# Patient Record
Sex: FEMALE | Race: AMERICAN INDIAN OR ALASKA NATIVE | URBAN - METROPOLITAN AREA
[De-identification: names, ages, dates, MRNs, and addresses within clinical notes are randomized per-mention and may not be internally consistent; named-entity substitution may affect disease eponyms.]

---

## 2022-07-07 ENCOUNTER — OFFICE VISIT (OUTPATIENT)
Dept: URBAN - METROPOLITAN AREA CLINIC 98 | Facility: CLINIC | Age: 82
End: 2022-07-07
Payer: MEDICARE

## 2022-07-07 ENCOUNTER — WEB ENCOUNTER (OUTPATIENT)
Dept: URBAN - METROPOLITAN AREA CLINIC 98 | Facility: CLINIC | Age: 82
End: 2022-07-07

## 2022-07-07 VITALS
TEMPERATURE: 98 F | HEART RATE: 59 BPM | HEIGHT: 65 IN | WEIGHT: 119.6 LBS | SYSTOLIC BLOOD PRESSURE: 109 MMHG | BODY MASS INDEX: 19.93 KG/M2 | DIASTOLIC BLOOD PRESSURE: 66 MMHG

## 2022-07-07 DIAGNOSIS — R15.0 INCOMPLETE DEFECATION: ICD-10-CM

## 2022-07-07 DIAGNOSIS — R15.9 FULL INCONTINENCE OF FECES: ICD-10-CM

## 2022-07-07 DIAGNOSIS — K52.838 OTHER MICROSCOPIC COLITIS: ICD-10-CM

## 2022-07-07 PROBLEM — 235753003: Status: ACTIVE | Noted: 2022-07-07

## 2022-07-07 PROBLEM — 142231000119108: Status: ACTIVE | Noted: 2022-07-07

## 2022-07-07 PROCEDURE — 99202 OFFICE O/P NEW SF 15 MIN: CPT | Performed by: INTERNAL MEDICINE

## 2022-07-07 RX ORDER — ATORVASTATIN CALCIUM 10 MG/1
1 TABLET TABLET, FILM COATED ORAL ONCE A DAY
Status: ACTIVE | COMMUNITY

## 2022-08-16 NOTE — HPI-OTHER HISTORIES
Chief Complaint  Anemia    Jairo Kramer,*  Jairo Kramer MD      Subjective          Bing Cruz presents to [facility] for follow up for colon cancer surveillance. She follows with Dr. Lewis and last saw him back in August of 2021. History of stage I colon cancer diagnosed in 7/7/2020 and status post colon resection. We also see her for iron deficiency anemia.     Patient reports she was recently at her daughter's house for 6 weeks. She is now living locally with her other daughter. Reports since she has been back from 6 week visit with daughter and now has loose stools up to 2 days. Reports she was eating apple sauce and using fiber supplements and now she feels like her diet has changed being at home. I discussed with the patient it sounds like change in diet has caused colon distress and changes. She is eating well otherwise, weight is stable.     We discussed recent labs done today: WBC 7.83, hemoglobin 12.1., platelet count 216,000. CMP is normal acceptable for a trace increase in the total bili level. Patient had CT scans in May 2022 after going to the ER and was noted to not have any acute abdominal or pelvis  abnormalities noted. CT chest with stable small sub-centimeter PN nodules over the past year done on 5/5/2022.         History of Present Illness   Ms. Bing Cruz presents for 1 year f    Cancer Staging  No matching staging information was found for the patient.     Treatment intent: curative    Oncology/Hematology History Overview Note   6/23/20 Ascending colon pathology revealed invasive, moderated      differentiated adenocarcinoma.  3.5 cm.  T1, N0, M0 = stage I                 Surgeries      7/8/2020 Right hemicholectomy with negative margins and 14 lymph nodes negative performed by Dr. Back.         Malignant neoplasm of colon (HCC)   7/27/2021 Initial Diagnosis    Malignant neoplasm of colon (CMS/HCC)         Review of  Dr Valero diagnosed her with microscopic colitis about 5 years ago she says.  Her chart is not available to review.  She says she has watched her diet and has better control of her bowels.  She does sometimes feel urgency and also fecal incontinence kita when walking around, in the morning  no abdominal pain, no weight changes Systems   Constitutional: Positive for fatigue. Negative for appetite change, diaphoresis, fever, unexpected weight gain and unexpected weight loss.   HENT: Negative for hearing loss, sore throat and voice change.    Eyes: Negative for blurred vision, double vision, pain, redness and visual disturbance.   Respiratory: Negative for cough, shortness of breath and wheezing.    Cardiovascular: Negative for chest pain, palpitations and leg swelling.   Gastrointestinal: Positive for diarrhea.   Endocrine: Negative for cold intolerance, heat intolerance, polydipsia and polyuria.   Genitourinary: Negative for decreased urine volume, difficulty urinating, frequency and urinary incontinence.   Musculoskeletal: Negative for arthralgias, back pain, joint swelling and myalgias.   Skin: Negative for color change, rash, skin lesions and wound.   Neurological: Positive for weakness. Negative for dizziness, seizures, numbness and headache.   Hematological: Negative for adenopathy. Does not bruise/bleed easily.   Psychiatric/Behavioral: Negative for depressed mood. The patient is not nervous/anxious.    All other systems reviewed and are negative.      Current Outpatient Medications on File Prior to Visit   Medication Sig Dispense Refill   • acetaminophen (TYLENOL) 325 MG tablet Take 1 tablet by mouth Every 4 (Four) Hours As Needed for Fever (Temperature Greater Than 101F).     • aluminum-magnesium hydroxide-simethicone (MAALOX MAX) 400-400-40 MG/5ML suspension Take 15 mL by mouth Every 6 (Six) Hours As Needed for Heartburn.     • apixaban (ELIQUIS) 2.5 MG tablet tablet Take 1 tablet by mouth 2 (Two) Times a Day. 60 tablet    • ascorbic acid (VITAMIN C) 500 MG tablet Take 500 mg by mouth Daily.     • AZO-CRANBERRY PO Take  by mouth.     • colestipol (COLESTID) 1 g tablet TAKE 1 TABLET BY ORAL ROUTE 3 TIMES A DAY AS NEEDED FOR 30 DAYS DIARRHEA 270 tablet 1   • escitalopram (Lexapro) 5 MG tablet Take 1 tablet by mouth Daily. 30 tablet  3   • furosemide (Lasix) 20 MG tablet Take 1 tablet by mouth 2 (Two) Times a Day. 90 tablet 3   • Lactobacillus (Florajen Acidophilus) capsule Take 1 each by mouth Daily. 90 capsule 3   • latanoprost (XALATAN) 0.005 % ophthalmic solution Administer 1 drop to both eyes Daily.     • LORazepam (ATIVAN) 0.5 MG tablet Take 1 tablet by mouth At Night As Needed for Anxiety. 30 tablet 5   • magnesium oxide (MAG-OX) 400 tablet tablet Take 1 tablet by mouth 2 (Two) Times a Day.     • metoprolol succinate XL (TOPROL-XL) 100 MG 24 hr tablet TAKE ONE TABLET TWICE A  tablet 0   • potassium chloride 10 MEQ CR tablet Take 1 tablet by mouth 2 (Two) Times a Day. 180 tablet 3   • Probiotic Product (Florajen3) capsule TAKE 1 CAPSULE BY MOUTH EVERY DAY 90 capsule 1     No current facility-administered medications on file prior to visit.       Allergies   Allergen Reactions   • Citalopram Unknown - High Severity   • Clonazepam Unknown - High Severity   • Levofloxacin Nausea And Vomiting   • Lisinopril Unknown - High Severity   • Macrobid [Nitrofurantoin Macrocrystal] Rash   • Melatonin Unknown - High Severity     Past Medical History:   Diagnosis Date   • Abdominal pain, generalized    • Anemia    • Aortic regurgitation    • Aortic stenosis    • Cancer (HCC)     colon    • Cardiomegaly    • Chronic atrial fibrillation (HCC) 01/01/2019    Atrial fibrillation converted to sinus through cardioversion (March 2019   • Chronic fatigue    • Diarrhea    • Disease of tricuspid valve    • Diverticulosis of colon    • DVT (deep venous thrombosis) ([facility])     LEFT LEG GREATER THAN 5 YRS AGO   • Dysphagia    • Essential (primary) hypertension    • Hiatal hernia    • Insomnia, unspecified    • LVH (left ventricular hypertrophy)    • Major depressive disorder, single episode    • Mitral regurgitation    • Mitral valve insufficiency and aortic valve insufficiency    • Osteopenia    • Pain in joint, pelvic region and thigh    • TR (tricuspid  regurgitation)    • UTI (urinary tract infection)     antibx to be completed prior to procedure. ABIOLA Carlos RN ([facility]) notified.     Past Surgical History:   Procedure Laterality Date   • ABDOMINAL HYSTERECTOMY      WITH BSO    • ANKLE SURGERY      (L) ankle fracture   • BLADDER REPAIR     • BREAST BIOPSY      (L) breast biopsy   • CARDIOVERSION  2019   • CATARACT EXTRACTION      OU   • CHOLECYSTECTOMY      OPEN   • COLON SURGERY      STATES SHE HAD 12 INCHES OF COLON REMOVED IN JULY 2020 FOR COLON CANCER   • COLONOSCOPY  2020   • COLONOSCOPY N/A 10/29/2021    Procedure: COLONOSCOPY;  Surgeon: Edmar Back MD;  Location: [facility] ENDOSCOPY;  Service: General;  Laterality: N/A;  DIVERTICULOSIS/ANASTOMOSIS   • FEMUR OPEN REDUCTION INTERNAL FIXATION Left 07/21/2021    Procedure: FEMORAL NECK OPEN REDUCTION INTERNAL FIXATION;  Surgeon: Bam Warner MD;  Location: [facility] MAIN OR;  Service: Orthopedics;  Laterality: Left;   • HIP CLOSED REDUCTION Left 3/20/2022    Procedure: HIP CLOSED REDUCTION;  Surgeon: Harsha Gayle MD;  Location: [facility] MAIN OR;  Service: Orthopedics;  Laterality: Left;   • INGUINAL HERNIA REPAIR Left 09/29/2011   • TOTAL HIP ARTHROPLASTY Left 3/16/2022    Procedure: LEFT TOTAL HIP ARTHROPLASTY AND HARDWARE REMOVAL;  Surgeon: Bam Warner MD;  Location: [facility] MAIN OR;  Service: Orthopedics;  Laterality: Left;   • UPPER GASTROINTESTINAL ENDOSCOPY      WITH DILATATION     Social History     Socioeconomic History   • Marital status:    Tobacco Use   • Smoking status: Never Smoker   • Smokeless tobacco: Never Used   Vaping Use   • Vaping Use: Former   Substance and Sexual Activity   • Alcohol use: Not Currently   • Drug use: Never   • Sexual activity: Defer     Family History   Problem Relation Age of Onset   • Malig Hyperthermia Neg Hx      Immunization History   Administered Date(s) Administered   • COVID-19 (MODERNA) 1st, 2nd, 3rd Dose Only 03/10/2021, 04/07/2021   •  COVID-19 (MODERNA) BOOSTER 03/10/2021, 04/07/2021   • Fluzone High-Dose 65+yrs 11/12/2020, 11/08/2021   • Influenza, Unspecified 11/12/2020       Objective   Physical Exam  Vitals and nursing note reviewed.   Constitutional:       Appearance: Normal appearance. She is normal weight.   HENT:      Head: Normocephalic.      Mouth/Throat:      Mouth: Mucous membranes are moist.   Eyes:      Pupils: Pupils are equal, round, and reactive to light.   Cardiovascular:      Rate and Rhythm: Normal rate and regular rhythm.      Pulses: Normal pulses.      Heart sounds: Normal heart sounds. No murmur heard.  Pulmonary:      Effort: Pulmonary effort is normal. No respiratory distress.      Breath sounds: Normal breath sounds. No wheezing, rhonchi or rales.   Abdominal:      General: Bowel sounds are normal.      Palpations: Abdomen is soft.   Musculoskeletal:         General: Normal range of motion.      Cervical back: Normal range of motion and neck supple.   Skin:     General: Skin is warm and dry.      Capillary Refill: Capillary refill takes less than 2 seconds.   Neurological:      Mental Status: She is oriented to person, place, and time.   Psychiatric:         Mood and Affect: Mood normal.         Behavior: Behavior normal.         Thought Content: Thought content normal.         Judgment: Judgment normal.         Vitals:    08/16/22 0945   BP: 132/76   Pulse: 72   Resp: 18   Temp: 97.5 °F (36.4 °C)   SpO2: 96%   Weight: 64.2 kg (141 lb 8.6 oz)   PainSc: 0-No pain     ECOG score: 1         ECOG: (0) Fully Active - Able to Carry On All Pre-disease Performance Without Restriction  Fall Risk Assessment was completed, and patient is at low risk for falls.  PHQ-9 Total Score: 1       The patient is  experiencing fatigue. Fatigue score: 8    PT/OT Functional Screening: PT fx screen: No needs identified  Speech Functional Screening: Speech fx screen: No needs identified  Rehab to be ordered: Rehab to be ordered: No needs  identified        Result Review :   The following data was reviewed by: GABRIELLA Lance on 08/16/2022:  Lab Results   Component Value Date    HGB 12.1 08/16/2022    HCT 37.2 08/16/2022    MCV 93.2 08/16/2022     08/16/2022    WBC 7.83 08/16/2022    NEUTROABS 5.01 08/16/2022    LYMPHSABS 2.12 08/16/2022    MONOSABS 0.52 08/16/2022    EOSABS 0.13 08/16/2022    BASOSABS 0.04 08/16/2022     Lab Results   Component Value Date    GLUCOSE 100 (H) 08/16/2022    BUN 16 08/16/2022    CREATININE 0.78 08/16/2022     08/16/2022    K 3.7 08/16/2022     08/16/2022    CO2 21.9 (L) 08/16/2022    CALCIUM 9.2 08/16/2022    PROTEINTOT 7.3 08/16/2022    ALBUMIN 4.15 08/16/2022    BILITOT 1.3 (H) 08/16/2022    ALKPHOS 90 08/16/2022    AST 19 08/16/2022    ALT 6 08/16/2022          Assessment and Plan    Diagnoses and all orders for this visit:    1. Malignant neoplasm of ascending colon (HCC) (Primary)  -     Cancel: CBC & Differential; Future  -     Cancel: Comprehensive Metabolic Panel; Future  -     Cancel: CEA; Future  -     CBC & Differential; Future  -     CEA; Future  -     Comprehensive Metabolic Panel; Future    2. Iron deficiency anemia, unspecified iron deficiency anemia type  -     Cancel: CBC & Differential; Future  -     Cancel: Iron Profile; Future  -     Cancel: Ferritin; Future  -     CBC & Differential; Future  -     Ferritin; Future  -     Iron Profile; Future    Stage I colon cancer. Stable CBC and CMP. CEA is pending. Iron labs are pending. Recent diarhheal stools. We discussed to hold the fiber supplements for now and to hold the Colestipol and keep a journal of her food she is eating. Apparently, she just got back from seeing daughter in [place] for 6 weeks and her daughter had her eating very healthy and eating applesauce and now once she comes back to live with her [place] daughter, her colon is stressing and now with loose stools x  2 days. Discussed stopping all meds. If  still with loose stools, then she should follow up with her PCP for evaluation.         Patient Follow Up: follow up in 6 months with dr. Lewis.    Patient was given instructions and counseling regarding her condition or for health maintenance advice. Please see specific information pulled into the AVS if appropriate.     Rose Boykin, APRN    8/16/2022

## 2024-03-20 ENCOUNTER — OV EP (OUTPATIENT)
Dept: URBAN - METROPOLITAN AREA CLINIC 27 | Facility: CLINIC | Age: 84
End: 2024-03-20
Payer: MEDICARE

## 2024-03-20 VITALS
HEIGHT: 65 IN | HEART RATE: 60 BPM | WEIGHT: 111 LBS | SYSTOLIC BLOOD PRESSURE: 155 MMHG | DIASTOLIC BLOOD PRESSURE: 89 MMHG | BODY MASS INDEX: 18.49 KG/M2

## 2024-03-20 DIAGNOSIS — R63.4 ABNORMAL WEIGHT LOSS: ICD-10-CM

## 2024-03-20 DIAGNOSIS — R19.4 CHANGE IN BOWEL HABITS: ICD-10-CM

## 2024-03-20 DIAGNOSIS — Z79.82 LONG TERM (CURRENT) USE OF ASPIRIN: ICD-10-CM

## 2024-03-20 PROBLEM — 88111009: Status: ACTIVE | Noted: 2024-03-20

## 2024-03-20 PROBLEM — 267024001: Status: ACTIVE | Noted: 2024-03-20

## 2024-03-20 PROBLEM — 131531000119103: Status: ACTIVE | Noted: 2024-03-20

## 2024-03-20 PROCEDURE — 99204 OFFICE O/P NEW MOD 45 MIN: CPT | Performed by: PHYSICIAN ASSISTANT

## 2024-03-20 RX ORDER — ATORVASTATIN CALCIUM 10 MG/1
1 TABLET TABLET, FILM COATED ORAL ONCE A DAY
Status: ACTIVE | COMMUNITY

## 2024-03-20 NOTE — HPI-ZZZTODAY'S VISIT
Ms. Trujillo is an 83-year-old female new patient here for evaluation of fecal incontinence with Dr. Chaudhari. Her daughter is a patient of Dr. Chaudhari and is with her today. She has h/o intermittent diarrhea x at least 10 yrs, worsening over the last year. She has associated cramping, urgency and accidents. She has lost 7-8 lbs over the last 2 mos unintentionally. No rectal bleeding. She says she was diagnosed with microscopic colitis many years ago. Her last colonoscopy was >5 yrs ago and apparently normal. 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 PMH is significant for HTN; she denies cardiopulmonary dz. She takes ASA 81mg QD.

## 2024-03-29 ENCOUNTER — COLON (OUTPATIENT)
Dept: URBAN - METROPOLITAN AREA SURGERY CENTER 7 | Facility: SURGERY CENTER | Age: 84
End: 2024-03-29

## 2024-03-29 RX ORDER — ATORVASTATIN CALCIUM 10 MG/1
1 TABLET TABLET, FILM COATED ORAL ONCE A DAY
Status: ACTIVE | COMMUNITY

## 2024-04-26 ENCOUNTER — OV EP (OUTPATIENT)
Dept: URBAN - METROPOLITAN AREA CLINIC 27 | Facility: CLINIC | Age: 84
End: 2024-04-26
Payer: MEDICARE

## 2024-04-26 VITALS
HEIGHT: 65 IN | DIASTOLIC BLOOD PRESSURE: 82 MMHG | RESPIRATION RATE: 17 BRPM | HEART RATE: 78 BPM | WEIGHT: 114 LBS | SYSTOLIC BLOOD PRESSURE: 1137 MMHG | BODY MASS INDEX: 18.99 KG/M2

## 2024-04-26 DIAGNOSIS — R63.4 ABNORMAL WEIGHT LOSS: ICD-10-CM

## 2024-04-26 DIAGNOSIS — R19.4 CHANGE IN BOWEL HABITS: ICD-10-CM

## 2024-04-26 PROCEDURE — 99213 OFFICE O/P EST LOW 20 MIN: CPT | Performed by: INTERNAL MEDICINE

## 2024-04-26 RX ORDER — ATORVASTATIN CALCIUM 10 MG/1
1 TABLET TABLET, FILM COATED ORAL ONCE A DAY
Status: ACTIVE | COMMUNITY

## 2024-04-26 NOTE — HPI-TODAY'S VISIT:
This is a 83-year-old female seen in follow-up consultation for chronic intermittent diarrhea.  She was started on Benefiber and she is found that improved her symptoms profoundly.  She has not had any episodes of diarrhea since starting the medication.  She has not had to use any Imodium.  She had a recent colonoscopy with negative random biopsies.  The fecal incontinence has resolved as well.

## 2025-01-18 ENCOUNTER — P2P PATIENT RECORD (OUTPATIENT)
Age: 85
End: 2025-01-18